# Patient Record
Sex: FEMALE
[De-identification: names, ages, dates, MRNs, and addresses within clinical notes are randomized per-mention and may not be internally consistent; named-entity substitution may affect disease eponyms.]

---

## 2024-04-09 ENCOUNTER — APPOINTMENT (OUTPATIENT)
Dept: ORTHOPEDIC SURGERY | Facility: CLINIC | Age: 44
End: 2024-04-09
Payer: MEDICAID

## 2024-04-09 DIAGNOSIS — Z87.891 PERSONAL HISTORY OF NICOTINE DEPENDENCE: ICD-10-CM

## 2024-04-09 DIAGNOSIS — Z78.9 OTHER SPECIFIED HEALTH STATUS: ICD-10-CM

## 2024-04-09 DIAGNOSIS — I10 ESSENTIAL (PRIMARY) HYPERTENSION: ICD-10-CM

## 2024-04-09 DIAGNOSIS — M79.644 PAIN IN RIGHT FINGER(S): ICD-10-CM

## 2024-04-09 DIAGNOSIS — I80.9 PHLEBITIS AND THROMBOPHLEBITIS OF UNSPECIFIED SITE: ICD-10-CM

## 2024-04-09 PROBLEM — Z00.00 ENCOUNTER FOR PREVENTIVE HEALTH EXAMINATION: Status: ACTIVE | Noted: 2024-04-09

## 2024-04-09 PROCEDURE — 99204 OFFICE O/P NEW MOD 45 MIN: CPT

## 2024-04-09 PROCEDURE — 73140 X-RAY EXAM OF FINGER(S): CPT | Mod: F6

## 2024-04-09 RX ORDER — BUPROPION HYDROCHLORIDE 75 MG/1
TABLET, FILM COATED ORAL
Refills: 0 | Status: ACTIVE | COMMUNITY

## 2024-04-09 RX ORDER — AMLODIPINE BESYLATE 5 MG/1
TABLET ORAL
Refills: 0 | Status: ACTIVE | COMMUNITY

## 2024-04-09 RX ORDER — ATORVASTATIN CALCIUM 80 MG/1
TABLET, FILM COATED ORAL
Refills: 0 | Status: ACTIVE | COMMUNITY

## 2024-04-09 NOTE — ASSESSMENT
[FreeTextEntry1] : I had a lengthy discussion with the patient regarding the etiology of the patient's right index finger dorsal radial soft tissue mass, most likely being a thrombosed vein.  I did explain that a definitive diagnosis cannot be established without a biopsy and other, more aggressive or concerning conditions, such sarcoma, may be present although much less likely.  Both nonoperative and operative treatment options were discussed, at length. The risks and benefits of each were communicated. I explained that management of the mass, likely a thrombosed vein, includes symptomatic management and avoidance of aggravating factors (rest, immobilization) or surgical excision. I explained that surgical excision is associated with the lowest risk of recurrence and provides a definitive diagnosis after the specimen is sent to pathology. I explained that the likelihood of a malignant lesion involving the hand/wrist is very low but not 0% and this can only be confirmed with a surgical excision/biopsy. Surgical risks are discussed included infection, wound healing issues, loss of motion, stiffness, nerve injury, numbness and tingling, electrical-burning pain, tendon rupture, postoperative pain, CRPS, swelling, recurrence of the mass and the possibility of revision surgery or reoperation, especially if malignant findings are noted on biopsy, which would warrant a larger procedure including wide excision.  Shared decision making was made, the patient preferred to have the right index finger soft tissue mass removed.  This will be scheduled for in the near future.  All questions were answered.

## 2024-04-09 NOTE — CONSULT LETTER
[Dear  ___] : Dear  [unfilled], [Consult Letter:] : I had the pleasure of evaluating your patient, [unfilled]. [Please see my note below.] : Please see my note below. [Consult Closing:] : Thank you very much for allowing me to participate in the care of this patient.  If you have any questions, please do not hesitate to contact me. [Sincerely,] : Sincerely, [FreeTextEntry3] : Jhon Street MD Orthopaedic Hand / Upper Extremity Surgeon

## 2024-04-09 NOTE — PHYSICAL EXAM
[de-identified] : Physical exam demonstrates the patient to be alert and oriented x 3 and capable of ambulation. The patient is well-developed and well-nourished in no apparent respiratory distress. The majority of the skin is intact bilaterally in the upper extremities without any bilateral elbow lymphadenopathy.  The patient's right index finger exhibits a roughly 3 x 4 mm bluish, pearly, mobile soft tissue mass at the dorsal radial aspect of the digit, at the level of the PIP joint, within the subcutaneous tissue.  No skin changes, discoloration, open wounds, drainage or signs of infection.  Full, symmetric active range of motion at the digit.  It is well-perfused and sensation is intact to light touch.  Flexor and extensor tendons are intact. [de-identified] : PA, oblique and lateral x-rays of the right index finger were obtained today to assess for bony injury.  No appreciation of acute fracture or dislocation.  No radiopaque lesions or foreign bodies in the soft tissues around the right index finger.

## 2024-04-09 NOTE — HISTORY OF PRESENT ILLNESS
[Right] : right hand dominant [FreeTextEntry1] : 43yr old patient presents today for evaluation of right index finger pain. Patient states she noticed swelling of the vein at the PIP joint in the right index finger on Thursday night. She states that on Friday morning, the swelling had gone down but noticed a blister in between the right thumb and index fingers. She later went to the urgent care where x-rays showed no fracture. She reports that she proceeded to [facility] ER where she had an ultrasound. Patient denies any injuries. Patient complains about tightness around the PIP joint of the right index finger.

## 2024-04-22 ENCOUNTER — APPOINTMENT (OUTPATIENT)
Dept: ORTHOPEDIC SURGERY | Facility: AMBULATORY SURGERY CENTER | Age: 44
End: 2024-04-22

## 2024-05-02 ENCOUNTER — APPOINTMENT (OUTPATIENT)
Dept: ORTHOPEDIC SURGERY | Facility: CLINIC | Age: 44
End: 2024-05-02